# Patient Record
Sex: MALE | Race: WHITE | NOT HISPANIC OR LATINO
[De-identification: names, ages, dates, MRNs, and addresses within clinical notes are randomized per-mention and may not be internally consistent; named-entity substitution may affect disease eponyms.]

---

## 2020-08-04 ENCOUNTER — APPOINTMENT (OUTPATIENT)
Dept: MRI IMAGING | Facility: CLINIC | Age: 65
End: 2020-08-04
Payer: COMMERCIAL

## 2020-08-04 PROCEDURE — 70551 MRI BRAIN STEM W/O DYE: CPT

## 2020-08-06 ENCOUNTER — APPOINTMENT (OUTPATIENT)
Dept: NEUROSURGERY | Facility: CLINIC | Age: 65
End: 2020-08-06
Payer: COMMERCIAL

## 2020-08-06 DIAGNOSIS — Q28.3 OTHER MALFORMATIONS OF CEREBRAL VESSELS: ICD-10-CM

## 2020-08-06 PROCEDURE — 99442: CPT

## 2020-08-12 ENCOUNTER — APPOINTMENT (OUTPATIENT)
Dept: MRI IMAGING | Facility: CLINIC | Age: 65
End: 2020-08-12
Payer: COMMERCIAL

## 2020-08-12 PROCEDURE — A9585C: CUSTOM

## 2020-08-12 PROCEDURE — 70553 MRI BRAIN STEM W/O & W/DYE: CPT

## 2020-08-12 PROCEDURE — A9585: CPT | Mod: JW

## 2020-09-16 PROBLEM — Q28.3 CAVERNOUS MALFORMATION: Status: ACTIVE | Noted: 2020-09-16

## 2020-09-16 NOTE — ASSESSMENT
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  There is no evidence of perilesional edema or any acute hemorrhage. \par Discussed this lesion is not the cause of his symptoms \par Discussed this lesion most likely has been present since birth. \par At this time recommend MRI brain with and without contrast in one year August 2021 then follow up in the office after.

## 2020-09-16 NOTE — HISTORY OF PRESENT ILLNESS
[Home] : at home, [unfilled] , at the time of the visit. [Medical Office: ([facility])___] : at the medical office located in

## 2020-09-16 NOTE — CONSULT LETTER
[Dear  ___] : Dear  [unfilled], [DrWoodrow  ___] : Dr. JIMENEZ [DrWoodrow ___] : Dr. JIMENEZ [FreeTextEntry1] : Thank you for requesting the neurosurgical consultation on Melo Rodriguez.  As you know, he is a 64-year-old right-handed male with a past medical history of an abdominal aortic aneurysm and multiple right lower extremity bypass procedures as well as possible hypertension and questionable small myocardial infarction in the past.  He underwent evaluation for severe pain on the left side of his head as well as a pulling sensation of his face.  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  There is no evidence of perilesional edema or any acute hemorrhage.  This is an incidental finding and certainly not the etiology of his symptoms.  I recommended that he undergo further evaluation by a neurologist.  In addition, I recommended conservative management with serial imaging with MRI on an annual basis.  The patient was apprised of these findings and should any future cerebrovascular issues arise, I will be more than happy to re-evaluate him at that time.\par \par  \par \par Thank you for allowing me to participate in the care of this pleasant patient.  If you have any questions, please do not hesitate to contact me.\par  [FreeTextEntry3] : \par Sincerely,\par \par Junito Fernández MD\par Professor of Neurological Surgery and Radiology\par  Department of Neurosurgery\par Director Cerebrovascular Surgery\par [facility] [employer] [facility]\par

## 2020-09-16 NOTE — REASON FOR VISIT
[Follow-Up: _____] : a [unfilled] follow-up visit [FreeTextEntry1] : Melo Rodriguez is a 64-year-old right-handed male with a past medical history of an abdominal aortic aneurysm and multiple right lower extremity bypass procedures as well as possible hypertension and questionable small myocardial infarction in the past.  He underwent evaluation for severe pain on the left side of his head as well as a pulling sensation of his face.  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
